# Patient Record
Sex: FEMALE | Race: WHITE
[De-identification: names, ages, dates, MRNs, and addresses within clinical notes are randomized per-mention and may not be internally consistent; named-entity substitution may affect disease eponyms.]

---

## 2019-01-17 ENCOUNTER — HOSPITAL ENCOUNTER (EMERGENCY)
Dept: HOSPITAL 76 - ED | Age: 24
Discharge: HOME | End: 2019-01-17
Payer: COMMERCIAL

## 2019-01-17 VITALS — DIASTOLIC BLOOD PRESSURE: 69 MMHG | SYSTOLIC BLOOD PRESSURE: 135 MMHG

## 2019-01-17 DIAGNOSIS — R19.7: ICD-10-CM

## 2019-01-17 DIAGNOSIS — R11.10: Primary | ICD-10-CM

## 2019-01-17 PROCEDURE — 99283 EMERGENCY DEPT VISIT LOW MDM: CPT

## 2019-01-17 PROCEDURE — 96361 HYDRATE IV INFUSION ADD-ON: CPT

## 2019-01-17 PROCEDURE — 96374 THER/PROPH/DIAG INJ IV PUSH: CPT

## 2019-01-17 NOTE — ED PHYSICIAN DOCUMENTATION
PD HPI NVD





- Stated complaint


Stated Complaint: NVD





- Chief complaint


Chief Complaint: Abd Pain





- History obtained from


History obtained from: Patient





- History of Present Illness


Timing - onset: How many hours ago (5)


Timing - duration: Hours (5)


Timing - details: Abrupt onset


Pain level max: 2


Pain level now: 2


Severity Comments: mild


Associated symptoms: No: Fever, Abdominal pain, Chest pain, Dizzy, Near syncope 

/ syncope


Contributing factors: Sick contact


Improved by: No: Eating, Laying still


Worsened by: Eating





Review of Systems


Ten Systems: 10 systems reviewed and negative


Constitutional: reports: Reviewed and negative


Eyes: reports: Reviewed and negative


Ears: reports: Reviewed and negative


Nose: reports: Reviewed and negative


Throat: reports: Reviewed and negative


Cardiac: reports: Reviewed and negative


Respiratory: reports: Reviewed and negative


GI: reports: Reviewed and negative


: reports: Reviewed and negative


Skin: reports: Reviewed and negative


Musculoskeletal: reports: Reviewed and negative


Neurologic: reports: Reviewed and negative


Psychiatric: reports: Reviewed and negative


Endocrine: reports: Reviewed and negative


Immunocompromised: reports: Reviewed and negative





PD PAST MEDICAL HISTORY





- Past Medical History


Past Medical History: Yes


GYN: Endometriosis





- Past Surgical History


Past Surgical History: Yes


Other past surgical history: Medical reviewed and not pertinent





- Present Medications


Home Medications: 


                                Ambulatory Orders











 Medication  Instructions  Recorded  Confirmed


 


Loperamide [Imodium] 2 mg PO QID PRN #10 capsule 01/17/19 


 


Ondansetron Odt [Zofran] 4 mg TL Q6H PRN #10 tablet 01/17/19 














- Allergies


Allergies/Adverse Reactions: 


                                    Allergies











Allergy/AdvReac Type Severity Reaction Status Date / Time


 


promethazine Allergy  Unknown Verified 01/17/19 02:24














- Living Situation


Living Situation: reports: With family


Living Arrangement: reports: At home





- Social History


Does the pt smoke?: No


Smoking Status: Never smoker


Does the pt drink ETOH?: No


Does the pt have substance abuse?: No





- Family History


Family history: reports: Other (Reviewed and not pertinent)





- Immunizations


Immunizations are current?: Yes





- POLST


Patient has POLST: No





PD ED PE NORMAL





- Vitals


Vital signs reviewed: Yes





- General


General: Alert and oriented X 3, No acute distress





- HEENT


HEENT: PERRL





- Neck


Neck: Supple, no meningeal sign





- Cardiac


Cardiac: RRR, No murmur





- Respiratory


Respiratory: Clear bilaterally





- Abdomen


Abdomen: Normal bowel sounds, Soft, Non tender, Non distended





- Derm


Derm: Warm and dry





- Extremities


Extremities: No deformity





- Neuro


Neuro: Alert and oriented X 3





- Psych


Psych: Normal mood, Normal affect





Results





- Vitals


Vitals: 


                               Vital Signs - 24 hr











  01/17/19 01/17/19





  02:22 03:43


 


Temperature 37.3 C 


 


Heart Rate 125 H 102 H


 


Respiratory 16 16





Rate  


 


Blood Pressure 141/81 H 135/69 H


 


O2 Saturation 98 97








                                     Oxygen











O2 Source                      Room air

















PD MEDICAL DECISION MAKING





- ED course


Complexity details: reviewed old records, re-evaluated patient, considered 

differential, d/w patient


ED course: 





23-year-old female with vomiting and diarrhea.  Symptoms are similar to sick 

contacts at home.  Symptoms improved with Zofran, IV fluids, Imodium.  Patient 

discharged with prescriptions for Zofran and Imodium.Benign abdominal exam.





Departure





- Departure


Disposition: 01 Home, Self Care


Clinical Impression: 


 Vomiting and diarrhea





Condition: Good


Instructions:  ED Diet Vomiting Diarrhea


Follow-Up: 


Your, PCP [Other]


Prescriptions: 


Loperamide [Imodium] 2 mg PO QID PRN #10 capsule


 PRN Reason: Diarrhea


Ondansetron Odt [Zofran] 4 mg TL Q6H PRN #10 tablet


 PRN Reason: Nausea / Vomiting


Comments: 


Follow-up with PCP as needed.  Take Zofran as needed for nausea.  Take Imodium 

as needed for Diarrhea.  Return with worsening symptoms.


Forms:  Activity restrictions